# Patient Record
Sex: MALE | Race: BLACK OR AFRICAN AMERICAN
[De-identification: names, ages, dates, MRNs, and addresses within clinical notes are randomized per-mention and may not be internally consistent; named-entity substitution may affect disease eponyms.]

---

## 2018-07-15 ENCOUNTER — HOSPITAL ENCOUNTER (OUTPATIENT)
Dept: HOSPITAL 92 - ERS | Age: 53
Setting detail: OBSERVATION
LOS: 2 days | Discharge: HOME | End: 2018-07-17
Attending: FAMILY MEDICINE | Admitting: FAMILY MEDICINE
Payer: SELF-PAY

## 2018-07-15 ENCOUNTER — HOSPITAL ENCOUNTER (EMERGENCY)
Dept: HOSPITAL 18 - NAV ERS | Age: 53
Discharge: ANOTHER HOSPITAL | End: 2018-07-15
Payer: SELF-PAY

## 2018-07-15 VITALS — BODY MASS INDEX: 26.6 KG/M2

## 2018-07-15 DIAGNOSIS — Z88.8: ICD-10-CM

## 2018-07-15 DIAGNOSIS — F20.9: ICD-10-CM

## 2018-07-15 DIAGNOSIS — E87.1: ICD-10-CM

## 2018-07-15 DIAGNOSIS — M41.9: ICD-10-CM

## 2018-07-15 DIAGNOSIS — G40.901: Primary | ICD-10-CM

## 2018-07-15 DIAGNOSIS — Z88.0: ICD-10-CM

## 2018-07-15 DIAGNOSIS — G40.909: Primary | ICD-10-CM

## 2018-07-15 DIAGNOSIS — F17.200: ICD-10-CM

## 2018-07-15 DIAGNOSIS — Z79.899: ICD-10-CM

## 2018-07-15 DIAGNOSIS — F17.210: ICD-10-CM

## 2018-07-15 DIAGNOSIS — I10: ICD-10-CM

## 2018-07-15 DIAGNOSIS — F31.9: ICD-10-CM

## 2018-07-15 DIAGNOSIS — F10.10: ICD-10-CM

## 2018-07-15 DIAGNOSIS — E87.6: ICD-10-CM

## 2018-07-15 DIAGNOSIS — E83.39: ICD-10-CM

## 2018-07-15 LAB
ACTUAL BICARBONATE (HCO3V): 20 MEQ/L (ref 22–28)
ALBUMIN SERPL BCG-MCNC: 4.1 G/DL (ref 3.5–5)
ALBUMIN SERPL BCG-MCNC: 4.4 G/DL (ref 3.5–5)
ALP SERPL-CCNC: 58 U/L (ref 40–150)
ALP SERPL-CCNC: 66 U/L (ref 40–150)
ALT SERPL W P-5'-P-CCNC: 17 U/L (ref 8–55)
ALT SERPL W P-5'-P-CCNC: 19 U/L (ref 8–55)
ANION GAP SERPL CALC-SCNC: 18 MMOL/L (ref 10–20)
ANION GAP SERPL CALC-SCNC: 26 MMOL/L (ref 10–20)
APAP SERPL-MCNC: (no result) MCG/ML (ref 10–30)
APTT PPP: 28.2 SEC (ref 22.9–36.1)
AST SERPL-CCNC: 35 U/L (ref 5–34)
AST SERPL-CCNC: 38 U/L (ref 5–34)
BASE EXCESS BLDA CALC-SCNC: -2.4 MEQ/L
BASOPHILS # BLD AUTO: 0 THOU/UL (ref 0–0.2)
BASOPHILS # BLD AUTO: 0.1 THOU/UL (ref 0–0.2)
BASOPHILS NFR BLD AUTO: 0 % (ref 0–1)
BASOPHILS NFR BLD AUTO: 0.9 % (ref 0–1)
BILIRUB SERPL-MCNC: 0.6 MG/DL (ref 0.2–1.2)
BILIRUB SERPL-MCNC: 1.1 MG/DL (ref 0.2–1.2)
BUN SERPL-MCNC: 6 MG/DL (ref 8.4–25.7)
BUN SERPL-MCNC: 7 MG/DL (ref 8.4–25.7)
CALCIUM SERPL-MCNC: 8.9 MG/DL (ref 7.8–10.44)
CALCIUM SERPL-MCNC: 9.1 MG/DL (ref 7.8–10.44)
CHLORIDE SERPL-SCNC: 95 MMOL/L (ref 98–107)
CHLORIDE SERPL-SCNC: 97 MMOL/L (ref 98–107)
CK MB SERPL-MCNC: 3.2 NG/ML (ref 0–6.6)
CO2 SERPL-SCNC: 13 MMOL/L (ref 22–29)
CO2 SERPL-SCNC: 22 MMOL/L (ref 22–29)
CREAT CL PREDICTED SERPL C-G-VRATE: 0 ML/MIN (ref 70–130)
CREAT CL PREDICTED SERPL C-G-VRATE: 0 ML/MIN (ref 70–130)
CRYSTAL-AUWI FLAG: 0 (ref 0–15)
DRUG SCREEN CUTOFF: (no result)
EOSINOPHIL # BLD AUTO: 0 THOU/UL (ref 0–0.7)
EOSINOPHIL # BLD AUTO: 0 THOU/UL (ref 0–0.7)
EOSINOPHIL NFR BLD AUTO: 0.2 % (ref 0–10)
EOSINOPHIL NFR BLD AUTO: 0.3 % (ref 0–10)
GLOBULIN SER CALC-MCNC: 4.4 G/DL (ref 2.4–3.5)
GLOBULIN SER CALC-MCNC: 4.4 G/DL (ref 2.4–3.5)
GLUCOSE SERPL-MCNC: 129 MG/DL (ref 70–105)
GLUCOSE SERPL-MCNC: 96 MG/DL (ref 70–105)
HEV IGM SER QL: 0.2 (ref 0–7.99)
HGB BLD-MCNC: 14.6 G/DL (ref 14–18)
HGB BLD-MCNC: 15.1 G/DL (ref 14–18)
HGB BLDV-MCNC: 13.7 G/DL (ref 13.1–17.2)
HYALINE CASTS #/AREA URNS LPF: (no result) LPF
INR PPP: 1.1
LYMPHOCYTES # BLD AUTO: 0.8 THOU/UL (ref 1.2–3.4)
LYMPHOCYTES # BLD: 0.9 THOU/UL (ref 1.2–3.4)
LYMPHOCYTES NFR BLD AUTO: 8.2 % (ref 21–51)
LYMPHOCYTES NFR BLD AUTO: 9.4 % (ref 21–51)
MAGNESIUM SERPL-MCNC: 1.7 MG/DL (ref 1.6–2.6)
MCH RBC QN AUTO: 32.3 PG (ref 27–31)
MCH RBC QN AUTO: 37 PG (ref 27–31)
MCV RBC AUTO: 102 FL (ref 78–98)
MCV RBC AUTO: 102 FL (ref 78–98)
MEDTOX CONTROL LINE VALID?: (no result)
MEDTOX READER #: (no result)
MONOCYTES # BLD AUTO: 0.8 THOU/UL (ref 0.11–0.59)
MONOCYTES # BLD AUTO: 1.1 THOU/UL (ref 0.11–0.59)
MONOCYTES NFR BLD AUTO: 10.6 % (ref 0–10)
MONOCYTES NFR BLD AUTO: 8 % (ref 0–10)
NEUTROPHILS # BLD AUTO: 7.9 THOU/UL (ref 1.4–6.5)
NEUTROPHILS # BLD AUTO: 8.2 THOU/UL (ref 1.4–6.5)
NEUTROPHILS NFR BLD AUTO: 79.6 % (ref 42–75)
NEUTROPHILS NFR BLD AUTO: 82.7 % (ref 42–75)
OSMOLALITY UR: 416 MOSM/KG (ref 300–900)
PATHC CAST-AUWI FLAG: 0 (ref 0–2.49)
PLATELET # BLD AUTO: 167 THOU/UL (ref 130–400)
PLATELET # BLD AUTO: 171 THOU/UL (ref 130–400)
POTASSIUM SERPL-SCNC: 3.7 MMOL/L (ref 3.5–5.1)
POTASSIUM SERPL-SCNC: 4 MMOL/L (ref 3.5–5.1)
PROTHROMBIN TIME: 14.2 SEC (ref 12–14.7)
RBC # BLD AUTO: 4.09 MILL/UL (ref 4.7–6.1)
RBC # BLD AUTO: 4.52 MILL/UL (ref 4.7–6.1)
SALICYLATES SERPL-MCNC: (no result) MG/DL (ref 15–30)
SODIUM SERPL-SCNC: 131 MMOL/L (ref 136–145)
SODIUM SERPL-SCNC: 132 MMOL/L (ref 136–145)
SODIUM UR-SCNC: 168 MMOL/L
SP GR UR STRIP: 1.01 (ref 1–1.04)
SPERM-AUWI FLAG: 0 (ref 0–9.9)
TROPONIN I SERPL DL<=0.01 NG/ML-MCNC: 0.01 NG/ML (ref ?–0.03)
WBC # BLD AUTO: 9.9 THOU/UL (ref 4.8–10.8)
WBC # BLD AUTO: 9.9 THOU/UL (ref 4.8–10.8)
YEAST-AUWI FLAG: 0 (ref 0–25)

## 2018-07-15 PROCEDURE — 80053 COMPREHEN METABOLIC PANEL: CPT

## 2018-07-15 PROCEDURE — 71045 X-RAY EXAM CHEST 1 VIEW: CPT

## 2018-07-15 PROCEDURE — 81015 MICROSCOPIC EXAM OF URINE: CPT

## 2018-07-15 PROCEDURE — 83935 ASSAY OF URINE OSMOLALITY: CPT

## 2018-07-15 PROCEDURE — 96372 THER/PROPH/DIAG INJ SC/IM: CPT

## 2018-07-15 PROCEDURE — 84484 ASSAY OF TROPONIN QUANT: CPT

## 2018-07-15 PROCEDURE — 94760 N-INVAS EAR/PLS OXIMETRY 1: CPT

## 2018-07-15 PROCEDURE — 85730 THROMBOPLASTIN TIME PARTIAL: CPT

## 2018-07-15 PROCEDURE — 85610 PROTHROMBIN TIME: CPT

## 2018-07-15 PROCEDURE — 87086 URINE CULTURE/COLONY COUNT: CPT

## 2018-07-15 PROCEDURE — 84100 ASSAY OF PHOSPHORUS: CPT

## 2018-07-15 PROCEDURE — 83930 ASSAY OF BLOOD OSMOLALITY: CPT

## 2018-07-15 PROCEDURE — 70450 CT HEAD/BRAIN W/O DYE: CPT

## 2018-07-15 PROCEDURE — 87040 BLOOD CULTURE FOR BACTERIA: CPT

## 2018-07-15 PROCEDURE — 96366 THER/PROPH/DIAG IV INF ADDON: CPT

## 2018-07-15 PROCEDURE — 84300 ASSAY OF URINE SODIUM: CPT

## 2018-07-15 PROCEDURE — 93005 ELECTROCARDIOGRAM TRACING: CPT

## 2018-07-15 PROCEDURE — 96367 TX/PROPH/DG ADDL SEQ IV INF: CPT

## 2018-07-15 PROCEDURE — 80177 DRUG SCRN QUAN LEVETIRACETAM: CPT

## 2018-07-15 PROCEDURE — 96375 TX/PRO/DX INJ NEW DRUG ADDON: CPT

## 2018-07-15 PROCEDURE — 62270 DX LMBR SPI PNXR: CPT

## 2018-07-15 PROCEDURE — G0378 HOSPITAL OBSERVATION PER HR: HCPCS

## 2018-07-15 PROCEDURE — 96365 THER/PROPH/DIAG IV INF INIT: CPT

## 2018-07-15 PROCEDURE — 80048 BASIC METABOLIC PNL TOTAL CA: CPT

## 2018-07-15 PROCEDURE — 82140 ASSAY OF AMMONIA: CPT

## 2018-07-15 PROCEDURE — 85025 COMPLETE CBC W/AUTO DIFF WBC: CPT

## 2018-07-15 PROCEDURE — 80306 DRUG TEST PRSMV INSTRMNT: CPT

## 2018-07-15 PROCEDURE — 82805 BLOOD GASES W/O2 SATURATION: CPT

## 2018-07-15 PROCEDURE — 96360 HYDRATION IV INFUSION INIT: CPT

## 2018-07-15 PROCEDURE — 36415 COLL VENOUS BLD VENIPUNCTURE: CPT

## 2018-07-15 PROCEDURE — 82553 CREATINE MB FRACTION: CPT

## 2018-07-15 PROCEDURE — 84443 ASSAY THYROID STIM HORMONE: CPT

## 2018-07-15 PROCEDURE — 83735 ASSAY OF MAGNESIUM: CPT

## 2018-07-15 PROCEDURE — 82570 ASSAY OF URINE CREATININE: CPT

## 2018-07-15 PROCEDURE — 96361 HYDRATE IV INFUSION ADD-ON: CPT

## 2018-07-15 PROCEDURE — 80307 DRUG TEST PRSMV CHEM ANLYZR: CPT

## 2018-07-15 PROCEDURE — 81003 URINALYSIS AUTO W/O SCOPE: CPT

## 2018-07-15 PROCEDURE — 80185 ASSAY OF PHENYTOIN TOTAL: CPT

## 2018-07-15 RX ADMIN — HYDROCODONE BITARTRATE AND ACETAMINOPHEN PRN TAB: 10; 325 TABLET ORAL at 18:30

## 2018-07-15 RX ADMIN — ASCORBIC ACID, VITAMIN A PALMITATE, CHOLECALCIFEROL, THIAMINE HYDROCHLORIDE, RIBOFLAVIN-5 PHOSPHATE SODIUM, PYRIDOXINE HYDROCHLORIDE, NIACINAMIDE, DEXPANTHENOL, ALPHA-TOCOPHEROL ACETATE, VITAMIN K1, FOLIC ACID, BIOTIN, CYANOCOBALAMIN SCH MLS: 200; 3300; 200; 6; 3.6; 6; 40; 15; 10; 150; 600; 60; 5 INJECTION, SOLUTION INTRAVENOUS at 18:26

## 2018-07-15 NOTE — PDOC.FPRHP
- History of Present Illness


Chief Complaint: Seizures


History of Present Illness: 





Mr. Gonzalez presents from [place] [facility] for status epilepticus. Significant other 

gives history, as pt is unable. Pt had 3 seizures before EMS was called. They 

were Saturday at 20:00, 22:00 and 23:30. First two lasted 30 min. Third one 

lasted about one hour. Patient did not return to baseline in between each 

seizure. This was unusual because typically he comes out of one after 30 

minutes. He couldn't talk and was staring, not responsive. Pt had another 

seizure with paramedics which was with 1 mg ativan. Pt has not been sick or had 

any complaints before this episode. Last seizure was 17/10/2018. Last time 

hospitalized for seizures was about 1 yr ago. Patient has longtime history of 

seizures, on keppra, supposed to take it BID. Wife, Sarina, knows he takes it 

every morning but unsure about pm dose. 





Pt has history of alcohol abuse. 3-6 beers/day. Had one beer yesterday. 


Has history of schizophrenia and bipolar. He hides medications/snacks etc from 

wife. Because of this, she is unsure of all his medications. At baseline, he 

often must be reminded to shower etc.


Pt is trying to apply for disability. Cannot afford medications. 











ED Course: 





CT head ([facility]), 500 keppra, 1mg ativan, 1.5g keppra, vanc/rocephin





- Allergies/Adverse Reactions


 Allergies











Allergy/AdvReac Type Severity Reaction Status Date / Time


 


lisinopril Allergy   Unverified 07/15/18 08:45


 


Penicillins Allergy   Unverified 07/15/18 08:45














- Home Medications


 











 Medication  Instructions  Recorded  Confirmed  Type


 


HYDROcodone/Acetaminophen 1 each PO Q6HR PRN 07/15/18 07/15/18 History





[Hydrocodone-Acetamin  mg]    


 


OLANZapine [ZyPREXA] 5 mg PO DAILY 07/15/18 07/15/18 History


 


amLODIPine Besylate [Norvasc] 10 mg PO DAILY 07/15/18 07/15/18 History


 


levETIRAcetam [Keppra] 500 mg PO BID 07/15/18 07/15/18 History














- History


PMHx:HTN, chronic back pain 2/2 scoliosis, epileptic seizures, bipolar, 

schizophrenia   


 


PSHx: Unknown





FHx:Unknown


 


Social: Smoker, 1PPD for >9yrs. 3-6 beers per day, not use other liquor like in 

the past. No drug use.


 








- Review of Systems


General: reports: fatigue.  denies: fever/chills, night sweats


Eyes: denies: eye pain, vision changes


ENT: denies: nasal congestion, rhinorrhea


Respiratory: denies: cough, shortness of breath


Cardiovascular: denies: chest pain, palpitation


Gastrointestinal: denies: nausea, vomiting, diarrhea, abdominal pain


Genitourinary: reports: incontinence (during seizure).  denies: dysuria


Skin: denies: rashes, lesions


Musculoskeletal: reports: pain (back and neck), tenderness


Neurological: reports: seizure, weakness


Psychological: denies: anxiety, depression





- Vital signs


BP: 145/97  HR: 99 RR: 16 Tmax: 98.4 Pox: 99% on RA  Wt: 81 kg   








- Physical Exam


Constitutional: other (Lethargic, with back and neck pain, oriented only to 

person. Could not answer all questions appropriately.)


HEENT: normocephalic and atraumatic, PERRLA, grossly normal vision, grossly 

normal hearing, MMM, oropharynx clear, other (lesions on both sides of tongue 

from biting during seizure)


Heart: RRR, normal S1/S2, no murmurs/rubs/gallops, pulses present


Lungs: CTAB, no respiratory distress


Abdomen: soft, bowel sounds present, other (Diffusely TTP)


Musculoskeletal: normal structure, normal tone, ROM grossly normal


Neurological: CN II-XII intact, normal sensation


Skin: capillary refill <2 seconds


Heme/Lymphatic: no unusual bruising or bleeding, no purpura





FMR H&P: Results





- Labs


Result Diagrams: 


 07/15/18 08:50





 07/15/18 08:50


Lab results: 


 











WBC  9.9 thou/uL (4.8-10.8)   07/15/18  08:50    


 


Hgb  15.1 g/dL (14.0-18.0)   07/15/18  08:50    


 


Hct  41.8 % (42.0-52.0)  L  07/15/18  08:50    


 


MCV  102.0 fL (78.0-98.0)  H  07/15/18  08:50    


 


Plt Count  167 thou/uL (130-400)   07/15/18  08:50    


 


Neutrophils %  79.6 % (42.0-75.0)  H  07/15/18  08:50    


 


Sodium  131 mmol/L (136-145)  L  07/15/18  08:50    


 


Potassium  3.7 mmol/L (3.5-5.1)   07/15/18  08:50    


 


Chloride  95 mmol/L ()  L  07/15/18  08:50    


 


Carbon Dioxide  22 mmol/L (22-29)   07/15/18  08:50    


 


BUN  6 mg/dL (8.4-25.7)  L  07/15/18  08:50    


 


Creatinine  0.84 mg/dL (0.6-1.3)   07/15/18  08:50    


 


Glucose  96 mg/dL ()   07/15/18  08:50    


 


Calcium  9.1 mg/dL (7.8-10.44)   07/15/18  08:50    


 


Total Bilirubin  1.1 mg/dL (0.2-1.2)   07/15/18  08:50    


 


AST  38 U/L (5-34)  H  07/15/18  08:50    


 


ALT  17 U/L (8-55)   07/15/18  08:50    


 


Alkaline Phosphatase  66 U/L ()   07/15/18  08:50    


 


Ammonia  37 umol/L (18-72)   07/15/18  08:50    


 


Serum Total Protein  8.8 g/dL (6.0-8.3)  H  07/15/18  08:50    


 


Albumin  4.4 g/dL (3.5-5.0)   07/15/18  08:50    


 


Urine Ketones  15 mg/dL (Negative)  H  07/15/18  10:22    


 


Urine Blood  Moderate  (Negative)  H  07/15/18  10:22    


 


Urine Nitrite  Negative  (Negative)   07/15/18  10:22    


 


Ur Leukocyte Esterase  Negative  (Negative)   07/15/18  10:22    


 


Urine RBC  None Seen HPF (0-3)   07/15/18  10:22    


 


Urine WBC  None Seen HPF (0-3)   07/15/18  10:22    


 


Ur Squamous Epith Cells  None Seen HPF (0-3)   07/15/18  10:22    


 


Urine Bacteria  None Seen HPF (None Seen)   07/15/18  10:22    














- Radiology Interpretation


  ** CT scan - head


Status: report reviewed by me (No acute process)





  ** Chest x-ray


Status: report reviewed by me (no acute disease)





FMR H&P: A/P





- Problem List


(1) Status epilepticus


Current Visit: Yes   Status: Acute   Code(s): G40.901 - EPILEPSY, UNSP, NOT 

INTRACTABLE, WITH STATUS EPILEPTICUS   





(2) HTN (hypertension)


Current Visit: Yes   Status: Acute   Code(s): I10 - ESSENTIAL (PRIMARY) 

HYPERTENSION   





(3) Hyponatremia


Current Visit: Yes   Status: Acute   Code(s): E87.1 - HYPO-OSMOLALITY AND 

HYPONATREMIA   





(4) Alcohol abuse


Current Visit: Yes   Status: Acute   Code(s): F10.10 - ALCOHOL ABUSE, 

UNCOMPLICATED   





(5) Tobacco abuse


Current Visit: Yes   Status: Acute   Code(s): Z72.0 - TOBACCO USE   





- Plan





Status epilepticus 


-pt has history of epileptic seizures. Unsure if taking keppra as prescribed. 

Consider meds vs alcohol withdrawal vs infection as possible contributory 

factors. Pt had one isolated fever 100.9 in ER, along with HTN and tachycardia. 

LP attempted but unsuccessful in ER. Patient does not have signs of 

meningismus. Vital sign abnormality could be from seizures. Considering no WBC 

elevation, no current concern for infection. Home medications are unknown. 

Reported pharmacy called which only had prescription for norco 10 and 

amlodipine (not filled). Unsure of home keppra dose or where pt is getting it. 

Pt received Keppra in ED so will not check keppra level. 


-CT head negative for acute process


-will need to confirm meds in the morning


-BMP in am


-Consulted neurology. Greatly appreciate recommendations.


-scheduled keppra


-Mag, phos ordered


-TSH normal


-one dose vanc/rocephin in ER, discontinued


-drug screen + for benzos





HTN


-systolic 170s on admission


-was prescribed amlodipine 5mg daily, but did not fill this prescription


-started on amlodipine, will monitor BPs





Hyponatremia


-Na+ 131 on admission


-ordered studies to calculate Fena





Alcohol abuse


-3-6 beers daily. last drink 7/14. No known history of alcohol withdrawal


-ASE protocol initiated





Tobacco abuse


-nicotine patch











FMR H&P: Upper Level





- Pertinent history





53M presents with cc of seizure. He had 3 seizure the previous day, lasting 

from 30 min to 1 hour. His wife described it as a seizure with jerking movement 

and a postictal state where he was confused. His last seizure was successfully 

treated with 1 mg of ativan by EMS. He has had seizure in the past requiring 

hospitalization. His wife states he takes Keppra BID, but is unsure if he is 

taking it as directed. When his pharmacy was called, they did not find any 

records of Keppra being dispensed.





His history is pertinent for alcohol abuse 3-6 beers a day. His last beer was 

yesterday. His psych history is pertnient for schizophrenia and bipolar which 

he supposedly takes Zyprexa. Pharmacy could not find any record of that med. At 

baseline, his wife has to remind him to shower.





See Intern note ROS, PMHx, PShx, Social Hx, FHx and allergies.





Physical Exam:


Gen: Lethargic at time, and occasional inappropriate answer, but arousable. 

Post ativan treatment


HEENT: No obvious trauma, PERRLA, grossly normal vision and hearing. Tongue 

with laceration on right side, hemostatic


CV: RRR with normal s1/s2. No MGR


Resp: CTA, no resp distress


Abd: Soft, no guarding, normoactive


MSK: Normal structure, tone


Neuro: CN II-XII intact, normal sensation and strength 5/5 in upper and lower 

limb





Pertinent Lab/Imaging


Na 131


Glucose 96


WBC 9.9


Ammonia 37


CT Head: No acute abnormalities





A/P


1. Reported Status epilepticus


- Patient with history of seizure who had seizure and resolved with ativan 

administration.


- Consider infection but no sign on exam of meningitis, without rigidity, 

improving of clinical condition after ativan. DC abx at this time.


- Consider medication noncompliance. Start on Keppra. Unknown home dose. 

Advised wife to bring in medication bottle from home. 


- Consult Neurology for recs. Obs on stroke floor.





2. HTN


- Elevated BP in ER, received 1x dose of labetalol. 


- Start patient on amlodipine, based on patient's pharmacy list





3. Hyponatremia


-  on admission


- Consider beer potomania, dehydration


- Order FENA and osm study. 





4. Alcohol abuse


- 3 to 6 alcohol daily with last known drink day prior to admission.


- ASE protocol.





5. Tobacco abuse


- Nicotine patch, advise cessation





- Plan


Date/Time: 07/15/18 1410








I, [Jaime Ly], have evaluated this patient and agree with findings/plan as 

outlined by intern resident. Pertinent changes/additions are listed here.

## 2018-07-15 NOTE — ADD-HP
ADDENDUM

 

Please see the notes from Dr. Victoria for which I agree.  The patient was seen, evaluated, examined, 
and discussed with the residents.

 

HISTORY OF PRESENT ILLNESS:  This gentleman came in with basically multiple seizures last night.  It 
is unclear if he is really compliant on his medicines.  There is also some question if he may actuall
y be withdrawing from alcohol, as his level was basically 0 and it sounds like he may drink 6 drinks 
a day, but he is very postictal in between these seizures, was taken to ER out of town and given Kepp
ra, and then eventually was given more Keppra here.  There was some concern about meningitis, althoug
h he has had no headaches, neck pain other than chronic neck pain, or major fever, but in ER here, he
 had slight fever of 100.4.  So, he was given vancomycin and Keppra, but he is describing no fever or
 any infectious type issues here.

 

PAST MEDICAL HISTORY:  Really otherwise is negative other than this, although there is some question 
about the possibility of him being schizophrenic or bipolar, but he is not a very good historian.

 

MEDICATIONS:  Xarelto, unclear exactly what he is on other than Keppra, but it sounds like he may be 
taking daily instead of twice daily.

 

PAST SURGICAL HISTORY:  All per the resident's history and physical.

 

FAMILY HISTORY:  All per the resident's history and physical.

 

REVIEW OF SYSTEMS:  All per the resident's history and physical.

 

PHYSICAL EXAMINATION:

VITAL SIGNS:  Afebrile.  Vital signs were stable.

GENERAL:  Alert and oriented x3.  A little groggy, but certainly making sense.  No apparent distress.


HEENT:  Within normal limits.

CHEST:  Clear.

CARDIAC:  Regular rate and rhythm.

NEUROLOGIC:  Currently is normal.

 

LABORATORY AND X-RAY FINDINGS:  Labs were reviewed.  Pretty much negative other than sodium being a l
ittle bit low at 131, but no elevated white count.

 

ASSESSMENT:  Seizure disorder and status epilepticus, now resolved.

 

PLAN:  Plan is to continue home Keppra and we will follow levels and I will get Neurology's opinion o
n this.  Again, we need to watch him for alcohol withdrawal and that why lorazepam ordered as needed 
and otherwise, I do not think there is any reason to consider this meningitis, so the antibiotics meryl grimaldo started in the ER were discontinued.

## 2018-07-15 NOTE — RAD
CHEST ONE VIEW:

 

History: 

53-year-old male with history of seizure. 

 

FINDINGS: 

Monitor leads overlie the chest. Heart size is within normal limits. No confluent pneumonia, overt ed
ema, or pleural effusion. 

 

IMPRESSION: 

No acute intrathoracic disease. 

 

POS: [facility]

## 2018-07-15 NOTE — CT
PRELIMINARY REPORT/[facility]/EMERGENTY AFTER-HOURS PROCEDURE 

 

CT Head Without Intravenous Contrast

 

CLINICAL HISTORY:

53 years old, male; Condition or disease; Convulsions or seizures; Epilepsy; Severity not specified; 
Patient HX: The patient has had 3-4 seizures in the last 12 hrs, and previous to that his last one wa
s 8 months ago. No definite etiology for the relapse. ; Additional info: Seizure quality described as


grand-mal, multiple episodes. Sudden onset of symptoms, date and time of onset was 07/14/2018 20:00.

 

TECHNIQUE:

Axial computed tomography images of the head/brain without intravenous contrast. All CT scans at this
 facility use at least one of these dose optimization techniques: automated exposure control; Ma and/
or kV adjustment per patient size (includes targeted exams where dose is matched to clinical

indication); or iterative reconstruction.

 

COMPARISON:

No relevant prior studies available.

 

FINDINGS:

Study is degraded by motion, streak and beam hardening artifacts.

Given this limitation, there is no gross obvious intracranial hemorrhage, midline shift, mass, mass e
ffect, hydrocephalus or extraaxial fluid collection in the visualized non-degraded portions of the br
ain parenchyma.

Diffuse cerebral volume loss.

Chronic small vessel disease.

Basal cisterns are patent.

Gray-white matter differentiation is preserved.

No dense MCA sign.

Paranasal sinuses are clear.

Mastoid air cells are clear.

No acute fracture.

Soft tissues unremarkable.

 

IMPRESSION:

No gross obvious acute intracranial abnormality in the visualized non-degraded portions of the brain 
parenchyma.

 

Thank you for allowing us to participate in the care of your patient.

Dictated and Authenticated by: Dwight Garcia MD

07/15/2018 7:04 AM Central Time ([place] & [place])

 

FINAL REPORT 

EMERGENCY AFTER HOURS CT BRAIN:

 

Date:  07/15/18 

 

IMPRESSION: 

I agree with the preliminary interpretation given by Nguyễn. 

No evidence for intracranial hemorrhage or mass effect. 

 

 

POS: [facility]

## 2018-07-16 LAB
ANION GAP SERPL CALC-SCNC: 12 MMOL/L (ref 10–20)
ANION GAP SERPL CALC-SCNC: 13 MMOL/L (ref 10–20)
BUN SERPL-MCNC: 5 MG/DL (ref 8.4–25.7)
BUN SERPL-MCNC: 6 MG/DL (ref 8.4–25.7)
CALCIUM SERPL-MCNC: 8.8 MG/DL (ref 7.8–10.44)
CALCIUM SERPL-MCNC: 9.2 MG/DL (ref 7.8–10.44)
CHLORIDE SERPL-SCNC: 94 MMOL/L (ref 98–107)
CHLORIDE SERPL-SCNC: 95 MMOL/L (ref 98–107)
CO2 SERPL-SCNC: 27 MMOL/L (ref 22–29)
CO2 SERPL-SCNC: 28 MMOL/L (ref 22–29)
CREAT CL PREDICTED SERPL C-G-VRATE: 111 ML/MIN (ref 70–130)
CREAT CL PREDICTED SERPL C-G-VRATE: 91 ML/MIN (ref 70–130)
GLUCOSE SERPL-MCNC: 69 MG/DL (ref 70–105)
GLUCOSE SERPL-MCNC: 85 MG/DL (ref 70–105)
POTASSIUM SERPL-SCNC: 2.7 MMOL/L (ref 3.5–5.1)
POTASSIUM SERPL-SCNC: 3.4 MMOL/L (ref 3.5–5.1)
SODIUM SERPL-SCNC: 131 MMOL/L (ref 136–145)
SODIUM SERPL-SCNC: 132 MMOL/L (ref 136–145)

## 2018-07-16 RX ADMIN — ASCORBIC ACID, VITAMIN A PALMITATE, CHOLECALCIFEROL, THIAMINE HYDROCHLORIDE, RIBOFLAVIN-5 PHOSPHATE SODIUM, PYRIDOXINE HYDROCHLORIDE, NIACINAMIDE, DEXPANTHENOL, ALPHA-TOCOPHEROL ACETATE, VITAMIN K1, FOLIC ACID, BIOTIN, CYANOCOBALAMIN SCH MLS: 200; 3300; 200; 6; 3.6; 6; 40; 15; 10; 150; 600; 60; 5 INJECTION, SOLUTION INTRAVENOUS at 17:59

## 2018-07-16 RX ADMIN — HYDROCODONE BITARTRATE AND ACETAMINOPHEN PRN TAB: 10; 325 TABLET ORAL at 03:48

## 2018-07-16 NOTE — PDOC.EVN
Event Note





- Event Note


Event Note: 





Discussed further with patient where he is filling his prescriptions. Pt states 

that [facility] in [place] is assisting his payments for his medications 

besides his narcotics, and he fills them at [facility] on [address] in 

[place]. He also states he hasn't taken his dilantin for about 2 months because 

he started feeling dizzy on it. The pharmacy student confirmed that he is 

filling prescriptions for 100 mg dilantin TID, and Keppra 500 BID and last 

filled his dilantin May 2nd for a 3mo supply. 





His regular doctor is Agus Gabriel, and his psychiatrist is Dr. Butt at [facility]. Pt wishes to transition his care to [facility] because 

of his recent move.

## 2018-07-16 NOTE — PDOC.FM
- Subjective


Subjective: 





54 yo M admitted for status epilepticus. Feeling well this morning, back pain 

improved with hydrocodone. Med list was brought in last night, includes 

amlodipine 10, keppra 500 BID, olanzapine 5, and hydrocodone. He is more alert 

and oriented this morning and says he does not remember his seizures. Says his 

last seizure was 4 months ago. He also complains of a sore leg and says he bit 

his tongue during the seizures, and his mouth is sore. 





- Objective


Vital Signs & Weight: 


 Vital Signs (12 hours)











  Temp Pulse Resp BP Pulse Ox


 


 07/16/18 04:00  98.0 F  87  16  129/82  96


 


 07/16/18 00:00  98.1 F  89  16  118/71  97


 


 07/15/18 20:00  98.2 F  95  16  114/73  96








 Weight











Weight                         83.325 kg














I&O: 


 











 07/14/18 07/15/18 07/16/18





 06:59 06:59 06:59


 


Intake Total   700


 


Balance   700











Result Diagrams: 


 07/15/18 08:50





 07/16/18 11:40





<Selina Pollack - Last Filed: 07/16/18 14:15>





- Objective


Vital Signs & Weight: 


 Vital Signs (12 hours)











  Temp Pulse Resp BP Pulse Ox


 


 07/16/18 19:26  98.3 F  86  20  136/86  97


 


 07/16/18 15:22  98.5 F  95  20  142/92 H  99


 


 07/16/18 11:50  98.4 F  83  20  116/78  97








 Weight











Weight                         83.325 kg














I&O: 


 











 07/15/18 07/16/18 07/17/18





 06:59 06:59 06:59


 


Intake Total  1050 


 


Output Total  400 450


 


Balance  650 -450











Result Diagrams: 


 07/15/18 08:50





 07/16/18 11:40





<Rosy Flaherty - Last Filed: 07/16/18 21:08>





Phys Exam





- Physical Examination


Constitutional: NAD


AxO3


HEENT: PERRLA, moist MMs, sclera anicteric


Neck: no nodes, supple


Respiratory: no rales, no rhonchi, wheezing present


Cardiovascular: RRR, no significant murmur


Gastrointestinal: soft, no distention


Musculoskeletal: no edema, pulses present


Neurological: normal sensation, moves all 4 limbs


Psychiatric: normal affect, A&O x 3


Skin: cap refill <2 seconds





<Selina Pollack - Last Filed: 07/16/18 14:15>





Dx/Plan





- Plan


Plan: 





1. Status epilepticus 


-Pt gives hx of taking keppra 500 BID and dilantin. Unable to confirm this with 

the pharmacy he mentioned. They do not have those meds on records and say he 

has not fillled any prescriptions there in a year. Need to confirm with pt that 

this is the pharmacy he is using.


-CT head negative for acute process


- CSF was unable to be obtained. Pt does not have meningismus at this time.


-Consulted neurology. Appreciate recommendations.


-scheduled keppra


-Mag - 1.7, phos 1.6 --- see problem #7 below.


-TSH normal


-one dose vanc/rocephin in ER, discontinued


-drug screen + for benzos (which he was given in ED)


- At this time patient is alert and oriented x3. 





2. HTN


-systolic 170s on admission, 1x dose labetolol in ED


-was started on amlodipine, based on patient's pharmacy list


-will monitor BPs, currently stable





3. Hyponatremia


-Na 131 on admission


-Consider beer potomania, dehydration





4. Alcohol abuse


-3-6 beers daily. last drink day prior to admission. No known history of 

alcohol withdrawal


-ASE protocol initiated





5.  Tobacco abuse


-nicotine patch, advise cessation





6. Hypokalemia


-Replaced with 40 of K


-redraw BMP this afternoon





7. Hypophosphatemia


-Phos: 1.6


-replaced with phos nak 


-Redraw BMP





8. Post-renal obstruction


-Urine showed high ketones and was positive for moderate blood


-Fena shows 4.1%, which indicates a post-renal cause


-In/out collins caths


-Nursing communication to do serial bladder scans for post cath residuals


-Strict I/Os











<Selina Pollack - Last Filed: 07/16/18 14:15>





Attending Addendum





- Attending Addendum


Date/Time: 07/16/18 [id number]





I personally evaluated the patient and discussed the management with Dr. Lopez


I agree with the History, Examination, Assessment and Plan documented above 

with any addition or exceptions noted below- Patient without complaints. No 

further seizures since admission. Afebrile VSS. A/P: 1) Seizure disorder- 

patient on keppra and dilantin though states that he has not been on the 

dilantin for about 2-3 months. Loafed with keppra in ER and  will resume hime 

dose. Will also restart dilantin. Case management to assist with transition to 

[facility]. 








<Rosy Flaherty - Last Filed: 07/16/18 21:08>

## 2018-07-17 VITALS — DIASTOLIC BLOOD PRESSURE: 90 MMHG | TEMPERATURE: 98.5 F | SYSTOLIC BLOOD PRESSURE: 128 MMHG

## 2018-07-17 LAB
ANION GAP SERPL CALC-SCNC: 10 MMOL/L (ref 10–20)
BUN SERPL-MCNC: 5 MG/DL (ref 8.4–25.7)
CALCIUM SERPL-MCNC: 9.1 MG/DL (ref 7.8–10.44)
CHLORIDE SERPL-SCNC: 100 MMOL/L (ref 98–107)
CO2 SERPL-SCNC: 26 MMOL/L (ref 22–29)
CREAT CL PREDICTED SERPL C-G-VRATE: 130 ML/MIN (ref 70–130)
GLUCOSE SERPL-MCNC: 98 MG/DL (ref 70–105)
POTASSIUM SERPL-SCNC: 3.4 MMOL/L (ref 3.5–5.1)
SODIUM SERPL-SCNC: 133 MMOL/L (ref 136–145)

## 2018-07-17 NOTE — CON
NEUROLOGIC CONSULTATION

 

DATE OF CONSULTATION:  07/16/2018

 

CONSULTING PHYSICIAN:  [facility].

 

IMPRESSION:

1.  Chronic epilepsy.

2.  History of closed head injury.

 

PLAN:

1.  Continue current doses of Dilantin and Keppra.

2.  The patient can follow up with his neurologist in [place].

 

HISTORY OF PRESENT ILLNESS:  Mr. Gonzalez is a 53-year-old -American man with a past history of
 seizures dating back 7 years.  He was injured on the job around 11 years ago when he was knocked unc
onscious by a piece of equipment.  He reportedly stayed in the hospital, but 2 months for rehabilitat
ion and recovery.  He had his first seizure 7 years ago and has been followed by a neurologist in [facility] area.  He reports being compliant with his medication, though at times, he has rajesh
e difficulty obtaining it due to financial circumstances.  He drinks on occasion and reports the last
 time he had anything to drink was 07/10.  He came in after having a witnessed seizure.  He was broug
ht in by the [place] ambulance to the hospital.  He had another seizure in the emergency room that w
as witnessed by the ER physician.  I was loaded with an extra 2000 mg of Keppra.  He was admitted for
 observation and has been clinically stable since admission.

 

ALLERGIES:  LISINOPRIL, PENICILLIN.

 

PAST MEDICAL HISTORY:  As listed above, also includes hypertension.

 

SOCIAL HISTORY:  Occasional alcohol use, but no illicit drug use.

 

FAMILY HISTORY:  Noncontributory.

 

REVIEW OF SYSTEMS:  No complaint of headache, nausea, vomiting, vertigo, chest pain, or shortness of 
breath.

 

PHYSICAL EXAMINATION:

GENERAL:  He is a well-nourished middle-aged man in no distress.

HEENT:  Pupils are equal and reactive.  Conjunctivae clear.  Oropharynx clear.

NECK:  Supple.

EXTREMITIES:  No cyanosis, clubbing, or edema.

NEUROLOGIC:  He is alert and appropriate.  His speech is fluent and clear.  Cranial nerves II through
 XII are intact.  Motor exam showed symmetric strength.  There is no tremor or dysmetria present.  Th
ere was no asterixis or tremor present.  Sensation is intact to light touch.  He can walk independent
ly.

 

IMAGING:  CT scan of the brain without contrast was unremarkable.

 

LABORATORY STUDIES:  Reviewed.

 

SUMMARY:  This is a middle-aged man with a longstanding history of seizures.  He had two breakthrough
 seizures prior to admission.  He has been given an extra loading dose of Keppra.  He seems to be cli
nically stable.  I would leave the management of his medications to his primary neurologist.

## 2018-07-17 NOTE — PDOC.FM
- Subjective


Subjective: 





Pt ready to go home. Pt concerned about ride home. Told him he will likely go 

today, but needs to talk with case management first. Wish MARQUE[facility] to [facility] [place]. 

Will see case management today. Eating and drinking well, voiding normally. 

Labs stable.





- Objective


Vital Signs & Weight: 


 Vital Signs (12 hours)











  Temp Pulse Resp BP Pulse Ox


 


 07/17/18 03:10  98.4 F  84  20  133/83  97


 


 07/17/18 00:20  98.5 F  110 H  20  141/101 H  93 L


 


 07/16/18 20:57  98.5 F  110 H  20  


 


 07/16/18 19:26  98.3 F  86  20  136/86  97








 Weight











Weight                         83.597 kg














I&O: 


 











 07/15/18 07/16/18 07/17/18





 06:59 06:59 06:59


 


Intake Total  1050 


 


Output Total  400 450


 


Balance  650 -450











Result Diagrams: 


 07/15/18 08:50





 07/17/18 06:03





<Selina Pollack - Last Filed: 07/17/18 09:20>





- Objective


Vital Signs & Weight: 


 Vital Signs (12 hours)











  Temp Pulse Resp BP BP Pulse Ox


 


 07/17/18 09:06   86   128/90  


 


 07/17/18 08:59  98.5 F  86  20   


 


 07/17/18 07:28  98.5 F  86  20   128/90  99








 Weight











Weight                         83.597 kg














I&O: 


 











 07/16/18 07/17/18 07/18/18





 06:59 06:59 06:59


 


Intake Total 1050 1112 


 


Output Total 400 450 


 


Balance 650 662 











Result Diagrams: 


 07/15/18 08:50





 07/17/18 06:03





<Rosy Flaherty - Last Filed: 07/17/18 16:49>





Phys Exam





- Physical Examination


Constitutional: NAD


HEENT: moist MMs


Neck: no nodes, supple


Respiratory: no wheezing, no rales, no rhonchi, clear to auscultation bilateral


Cardiovascular: RRR, no significant murmur, no rub


Gastrointestinal: soft, no distention, positive bowel sounds


Musculoskeletal: pulses present


Neurological: moves all 4 limbs


Psychiatric: normal affect, A&O x 3


Skin: no rash, normal turgor, cap refill <2 seconds





<Selina Pollack - Last Filed: 07/17/18 09:20>





Dx/Plan





- Plan


Plan: 





1. Status epilepticus, stable


-Pt gives hx of taking keppra 500 BID and dilantin 100 mg TID. Able to confirm 

with [facility] pharmacy.


-CT head negative for acute process


- CSF was unable to be obtained. Pt does not have meningismus at this time.


-Consulted neurology. Neurology does not wish to see him at this time due to 

compliance issues. 


-scheduled keppra and dilantin


-TSH normal


-one dose vanc/rocephin in ER, discontinued


-drug screen + for benzos (which he was given in ED)





2. HTN


-systolic 170s on admission, 1x dose labetolol in ED


-was started on amlodipine, based on patient's pharmacy list


-will monitor BPs, currently stable





3. Hyponatremia


-Na 131 on admission


-Consider beer potomania, dehydration





4. Alcohol abuse


-3-6 beers daily. last drink day prior to admission. No known history of 

alcohol withdrawal


-ASE protocol initiated





5. Tobacco abuse


-nicotine patch, advise cessation





6. Hypokalemia


-Replaced with 40 of K (7/16)


-Improved today at 3.4





7. Hypophosphatemia


-Phos: 1.6 on 7/16


-replaced with phos nak 


-Stable at 2.9





8. Post-renal obstruction


-Urine showed high ketones and was positive for moderate blood


-Fena shows 4.1%, which indicates a post-renal cause


-In/out collins caths with Serial bladder scan showed post void residual of 20 cc

, so no urinary retention suspected at this time.


-Strict I/Os





Status: may discharge today, after seeing case management.


Code: Full code





<Selina Pollack - Last Filed: 07/17/18 09:20>





Attending Addendum





- Attending Addendum


Date/Time: 07/17/18 7663





I personally evaluated the patient and discussed the management with Dr. BRIANNA Pollack


I agree with the History, Examination, Assessment and Plan documented above 

with any addition or exceptions noted below- Patient denies any complaints. 

Wants to go home. Afebrile VSS. A/P: 1) Seizure d/o- continue keppra and 

dilantin. Stressed importance of needing both medications. Information for 

local resources for new PCP given to pateint as well as medication assistance.








<Rosy Flaherty - Last Filed: 07/17/18 16:49>

## 2018-07-18 NOTE — DIS-2
DATE OF ADMISSION:  07/15/2018

 

DATE OF DISCHARGE:  07/17/2018

 

RESIDENT:  Selina Pollack M.D.

 

ADMITTING ATTENDING:  Dr Pascual Reilly 

 

DISCHARGE ATTENDING:  Dr. Rosy Flaherty

 

NEUROLOGIST:  Dr. Cristian Ware on 07/16/2018.

 

PROCEDURES:  

1.  Chest x-ray on 07/15/2018.  Impression:  No acute intrathoracic disease.  

2.  A 12-lead EKG.  Impression:  Sinus tachycardia on 07/15/2018.

 

PRIMARY DIAGNOSIS:  Status epilepticus with history of seizures.

 

SECONDARY DIAGNOSES:

1.  Hyponatremia.

2.  Hypophosphatemia.

3.  Alcohol abuse.

4.  Hypertension.

5.  Chronic back pain.

6.  Scoliosis.

7.  Bipolar.

8.  Schizophrenia.

9.  Tobacco abuse.

 

DISCHARGE MEDICATIONS:

1.  Keppra 500 mg p.o. b.i.d.

2.  Olanzapine 5 mg p.o. daily.

3.  Hydrocodone/acetaminophen 10/325 mg 1 p.o. q.6h. p.r.n.

4.  Amlodipine 10 mg p.o. daily.

5.  Dilantin 100 mg t.i.d. p.o.

 

DISCONTINUED MEDICATIONS:  

1.  Vancomycin, 07/15/2018-07/15/2018.

2.  Rocephin, 07/15/2018 to 07/15/2018.

 

HISTORY OF PRESENT ILLNESS AND HOSPITAL COURSE:  This is a 53-year-old male 
presenting from [facility] for status epilepticus.  Significant other gave his 
history as the patient was unavailable.  The patient had 3 seizures before 
ambulance was called.  The patient did not return to baseline in between each 
seizure, this was unusual for him.  He was given Ativan.  Normally he is on 
Keppra and Dilantin for his seizures.  His last seizure for which he was 
hospitalized was 1 year ago.  The patient has a history of alcohol abuse, 
drinking 3-6 beers a day.  He also has a history of schizophrenia and bipolar.  
He had a CT of his head in [place] which was negative.  The patient was given 
Ativan and Keppra.  He was also thought to have meningitis, but did not have 
nuchal rigidity so an LP was attempted, but was not able to get any CSF.  He 
was covered for bacterial meningitis with Rocephin and vancomycin.  He was 
admitted to observation for further workup treatment and a neuro consultation.  
Per the ED, he had more than 4 seizures in 24 hours without a clear return to 
baseline.  

 

Inpatient his home medicines were reconfirmed.  He had stopped taking Dilantin 
for approximately 2 months because he reported feeling dizzy.  He discontinued 
it on his own volition.  He was also hypertensive on admission to the ER, so he 
had been given 1 dose of labetalol.  He was restarted on his home amlodipine.  
There was also concern for hyponatremia.  Sodium was 131 on admission.  He 
reported drinking a lot of water and so we considered that maybe he had beer 
potomania.  He also reported drinking 3-6 beers daily, but he has no known 
history of alcohol withdrawal and does not appear to show symptoms while 
inpatient.  He was also treated for hypokalemia with 40 mEq of potassium and 
hypophosphatemia with N acetylcysteine.  There was also concern for post-renal 
obstruction because his fractional excretion of sodium showed a 4.1% which 
indicated a possible post-renal cause.  They did some serial bladder scans, but 
he did not have any residuals.  Because of a recent move he desired to 
transition his care to [place] for both his psych as well as his Family Medicine 
doctor.  He was stable upon discharge.

 

DISPOSITION:  Stable.

 

DISCHARGE INSTRUCTIONS:  

1.  Location:  Home.

2.  Diet:  Regular.

3.  Activity:  As tolerated.

4.  Followup:  Follow up within 1 week with your PCP.   

 

All of the patient's care was discussed with Dr. Rosy Flaherty.

 

GELY